# Patient Record
Sex: FEMALE | Race: BLACK OR AFRICAN AMERICAN | NOT HISPANIC OR LATINO | URBAN - METROPOLITAN AREA
[De-identification: names, ages, dates, MRNs, and addresses within clinical notes are randomized per-mention and may not be internally consistent; named-entity substitution may affect disease eponyms.]

---

## 2012-06-20 PROBLEM — Z98.890: Noted: 2021-01-25

## 2012-06-20 PROBLEM — Z98.890: Noted: 2021-02-02

## 2017-12-18 NOTE — PATIENT DISCUSSION
(L70.756) Other secondary cataract, bilateral - Assesment : Mild posterior capsule opacification present OU. - Plan : Monitor for changes. Pt to call with any vision changes or increased visual symptoms.

## 2017-12-18 NOTE — PATIENT DISCUSSION
(H40.013) Open angle with borderline findings, low risk, bilateral - Assesment : Examination revealed suspicion for Open Angle Glaucoma. OCT ONH and IOP wnl and stable today. - Plan : Monitor for IOP and NFL changes with visits and testing. RTC in 1 year for Exam and OCT ONH, sooner if problems or changes.

## 2018-12-17 NOTE — PATIENT DISCUSSION
(P68.626) Other secondary cataract, bilateral - Assesment : Mild posterior capsule opacification present OU. - Plan : Monitor for changes. Explained condition and handout given today. Discussed symptoms of worsening and becoming more bothersome. Pt to call with any vision changes or increased visual symptoms.

## 2018-12-17 NOTE — PATIENT DISCUSSION
(H40.013) Open angle with borderline findings, low risk, bilateral - Assesment : Examination revealed suspicion for Open Angle Glaucoma. OCT ONH performed: wnl and stable today. - Plan : Monitor for IOP and NFL changes with visits and OCTs. RTC in 1 year for Exam and OCT ONH, sooner if problems or changes.

## 2020-08-31 ENCOUNTER — IMPORTED ENCOUNTER (OUTPATIENT)
Dept: URBAN - METROPOLITAN AREA CLINIC 9 | Facility: CLINIC | Age: 80
End: 2020-08-31

## 2020-09-24 ENCOUNTER — IMPORTED ENCOUNTER (OUTPATIENT)
Dept: URBAN - METROPOLITAN AREA CLINIC 9 | Facility: CLINIC | Age: 80
End: 2020-09-24

## 2020-10-14 ENCOUNTER — IMPORTED ENCOUNTER (OUTPATIENT)
Dept: URBAN - METROPOLITAN AREA CLINIC 9 | Facility: CLINIC | Age: 80
End: 2020-10-14

## 2020-11-10 ENCOUNTER — IMPORTED ENCOUNTER (OUTPATIENT)
Dept: URBAN - METROPOLITAN AREA CLINIC 9 | Facility: CLINIC | Age: 80
End: 2020-11-10

## 2021-01-04 ENCOUNTER — IMPORTED ENCOUNTER (OUTPATIENT)
Dept: URBAN - METROPOLITAN AREA CLINIC 9 | Facility: CLINIC | Age: 81
End: 2021-01-04

## 2021-02-02 ENCOUNTER — IMPORTED ENCOUNTER (OUTPATIENT)
Dept: URBAN - METROPOLITAN AREA CLINIC 9 | Facility: CLINIC | Age: 81
End: 2021-02-02

## 2021-02-11 ENCOUNTER — IMPORTED ENCOUNTER (OUTPATIENT)
Dept: URBAN - METROPOLITAN AREA CLINIC 9 | Facility: CLINIC | Age: 81
End: 2021-02-11

## 2021-04-12 ENCOUNTER — IMPORTED ENCOUNTER (OUTPATIENT)
Dept: URBAN - METROPOLITAN AREA CLINIC 9 | Facility: CLINIC | Age: 81
End: 2021-04-12

## 2021-06-24 ENCOUNTER — IMPORTED ENCOUNTER (OUTPATIENT)
Dept: URBAN - METROPOLITAN AREA CLINIC 9 | Facility: CLINIC | Age: 81
End: 2021-06-24

## 2021-09-27 ENCOUNTER — IMPORTED ENCOUNTER (OUTPATIENT)
Dept: URBAN - METROPOLITAN AREA CLINIC 9 | Facility: CLINIC | Age: 81
End: 2021-09-27

## 2021-10-18 ASSESSMENT — KERATOMETRY
OD_K2POWER_DIOPTERS: 44.75
OD_K1POWER_DIOPTERS: 44
OD_K2POWER_DIOPTERS: 45.25
OS_K2POWER_DIOPTERS: 45
OD_AXISANGLE_DEGREES: 121
OS_AXISANGLE_DEGREES: 138
OS_K1POWER_DIOPTERS: 44.25
OS_K2POWER_DIOPTERS: 45.25
OD_K1POWER_DIOPTERS: 44.5
OS_AXISANGLE_DEGREES: 11
OD_AXISANGLE2_DEGREES: 31
OD_AXISANGLE_DEGREES: 67
OS_AXISANGLE2_DEGREES: 48
OD_AXISANGLE2_DEGREES: 157
OS_AXISANGLE2_DEGREES: 101
OS_K1POWER_DIOPTERS: 44.25

## 2021-10-18 ASSESSMENT — PACHYMETRY
OD_CT_UM: 533.0
OS_CT_UM: 534.0

## 2021-10-18 ASSESSMENT — VISUAL ACUITY
OD_SC: 20/30 - SN
OS_SC: 20/40 -2 SN
OS_SC: 20/40 -2 SN
OS_SC: 20/25 - SN
OS_SC: 20/40 - SN
OS_SC: 20/25 SN
OS_CC: 20/20 SN
OS_CC: 20/20 SN
OD_SC: 20/25 - SN
OD_SC: 20/30 + SN
OD_SC: 20/25 - SN
OD_SC: 20/50 -2 SN
OS_SC: 20/25 -2 SN
OD_SC: 20/40 SN
OD_CC: 20/20 SN
OD_CC: 20/25 - SN
OS_SC: 20/30 -2 SN

## 2021-10-18 ASSESSMENT — TONOMETRY
OD_IOP_MMHG: 18
OD_IOP_MMHG: 16
OD_IOP_MMHG: 15
OS_IOP_MMHG: 16
OD_IOP_MMHG: 15
OD_IOP_MMHG: 16
OS_IOP_MMHG: 15
OD_IOP_MMHG: 20
OS_IOP_MMHG: 13
OS_IOP_MMHG: 14
OS_IOP_MMHG: 15
OD_IOP_MMHG: 22
OS_IOP_MMHG: 18
OS_IOP_MMHG: 15
OS_IOP_MMHG: 19
OS_IOP_MMHG: 20
OD_IOP_MMHG: 19
OS_IOP_MMHG: 14

## 2021-10-27 ENCOUNTER — DIAGNOSTICS ONLY (OUTPATIENT)
Dept: URBAN - NONMETROPOLITAN AREA CLINIC 6 | Facility: CLINIC | Age: 81
End: 2021-10-27

## 2021-10-27 DIAGNOSIS — H40.1110: ICD-10-CM

## 2021-10-27 DIAGNOSIS — H40.1120: ICD-10-CM

## 2021-10-27 PROCEDURE — 92250 FUNDUS PHOTOGRAPHY W/I&R: CPT

## 2021-10-27 PROCEDURE — 92083 EXTENDED VISUAL FIELD XM: CPT

## 2021-10-27 ASSESSMENT — KERATOMETRY
OD_K1POWER_DIOPTERS: 44.5
OD_AXISANGLE_DEGREES: 121
OD_AXISANGLE2_DEGREES: 31
OD_K2POWER_DIOPTERS: 45.25
OS_K1POWER_DIOPTERS: 44.25
OS_K2POWER_DIOPTERS: 45
OS_AXISANGLE_DEGREES: 138
OS_AXISANGLE2_DEGREES: 48

## 2021-10-27 ASSESSMENT — TONOMETRY
OD_IOP_MMHG: 15
OS_IOP_MMHG: 16

## 2022-01-04 ENCOUNTER — FOLLOW UP (OUTPATIENT)
Dept: URBAN - NONMETROPOLITAN AREA CLINIC 6 | Facility: CLINIC | Age: 82
End: 2022-01-04

## 2022-01-04 DIAGNOSIS — H40.1111: ICD-10-CM

## 2022-01-04 DIAGNOSIS — H40.1121: ICD-10-CM

## 2022-01-04 DIAGNOSIS — H26.493: ICD-10-CM

## 2022-01-04 PROCEDURE — 99213 OFFICE O/P EST LOW 20 MIN: CPT

## 2022-01-04 PROCEDURE — 92015 DETERMINE REFRACTIVE STATE: CPT

## 2022-01-04 ASSESSMENT — TONOMETRY
OS_IOP_MMHG: 17
OD_IOP_MMHG: 18

## 2022-01-04 ASSESSMENT — VISUAL ACUITY
OD_SC: 20/25-2
OS_SC: 20/25

## 2022-04-21 ENCOUNTER — FOLLOW UP (OUTPATIENT)
Dept: URBAN - NONMETROPOLITAN AREA CLINIC 6 | Facility: CLINIC | Age: 82
End: 2022-04-21

## 2022-04-21 DIAGNOSIS — H40.1131: ICD-10-CM

## 2022-04-21 PROCEDURE — 99213 OFFICE O/P EST LOW 20 MIN: CPT

## 2022-04-21 ASSESSMENT — TONOMETRY
OS_IOP_MMHG: 15
OD_IOP_MMHG: 16

## 2022-04-21 ASSESSMENT — VISUAL ACUITY
OS_SC: 20/25-1
OD_SC: 20/25-1

## 2022-08-22 ENCOUNTER — FOLLOW UP (OUTPATIENT)
Dept: URBAN - NONMETROPOLITAN AREA CLINIC 6 | Facility: CLINIC | Age: 82
End: 2022-08-22

## 2022-08-22 DIAGNOSIS — H40.1131: ICD-10-CM

## 2022-08-22 PROCEDURE — 99213 OFFICE O/P EST LOW 20 MIN: CPT

## 2022-08-22 ASSESSMENT — VISUAL ACUITY
OD_SC: 20/25-1
OS_SC: 20/25-1

## 2022-08-22 ASSESSMENT — TONOMETRY
OD_IOP_MMHG: 15
OS_IOP_MMHG: 15

## 2023-01-16 ENCOUNTER — COMPREHENSIVE EXAM (OUTPATIENT)
Dept: URBAN - NONMETROPOLITAN AREA CLINIC 6 | Facility: CLINIC | Age: 83
End: 2023-01-16

## 2023-01-16 DIAGNOSIS — H04.123: ICD-10-CM

## 2023-01-16 DIAGNOSIS — H40.1131: ICD-10-CM

## 2023-01-16 PROCEDURE — 92015 DETERMINE REFRACTIVE STATE: CPT

## 2023-01-16 PROCEDURE — 92014 COMPRE OPH EXAM EST PT 1/>: CPT

## 2023-01-16 PROCEDURE — 92133 CPTRZD OPH DX IMG PST SGM ON: CPT

## 2023-01-16 ASSESSMENT — VISUAL ACUITY
OD_GLARE: 20/30
OS_SC: 20/25-2
OS_GLARE: 20/30+1
OD_SC: 20/25-1
OU_SC: 20/25-1

## 2023-01-16 ASSESSMENT — TONOMETRY
OS_IOP_MMHG: 16
OD_IOP_MMHG: 16